# Patient Record
Sex: FEMALE | Race: WHITE | NOT HISPANIC OR LATINO | Employment: FULL TIME | URBAN - METROPOLITAN AREA
[De-identification: names, ages, dates, MRNs, and addresses within clinical notes are randomized per-mention and may not be internally consistent; named-entity substitution may affect disease eponyms.]

---

## 2017-01-31 ENCOUNTER — PATIENT MESSAGE (OUTPATIENT)
Dept: RHEUMATOLOGY | Facility: CLINIC | Age: 37
End: 2017-01-31

## 2017-01-31 ENCOUNTER — OFFICE VISIT (OUTPATIENT)
Dept: RHEUMATOLOGY | Facility: CLINIC | Age: 37
End: 2017-01-31
Payer: COMMERCIAL

## 2017-01-31 VITALS
DIASTOLIC BLOOD PRESSURE: 80 MMHG | HEIGHT: 66 IN | BODY MASS INDEX: 30.41 KG/M2 | HEART RATE: 96 BPM | SYSTOLIC BLOOD PRESSURE: 123 MMHG | WEIGHT: 189.19 LBS

## 2017-01-31 DIAGNOSIS — R76.8 POSITIVE ANA (ANTINUCLEAR ANTIBODY): Primary | ICD-10-CM

## 2017-01-31 DIAGNOSIS — E55.9 VITAMIN D INSUFFICIENCY: Primary | ICD-10-CM

## 2017-01-31 DIAGNOSIS — R53.83 FATIGUE, UNSPECIFIED TYPE: ICD-10-CM

## 2017-01-31 DIAGNOSIS — F32.A ANXIETY AND DEPRESSION: ICD-10-CM

## 2017-01-31 DIAGNOSIS — L50.9 URTICARIAL DERMATITIS: ICD-10-CM

## 2017-01-31 DIAGNOSIS — F41.9 ANXIETY AND DEPRESSION: ICD-10-CM

## 2017-01-31 DIAGNOSIS — E66.9 OBESITY (BMI 30.0-34.9): ICD-10-CM

## 2017-01-31 PROCEDURE — 99999 PR PBB SHADOW E&M-EST. PATIENT-LVL III: CPT | Mod: PBBFAC,,, | Performed by: INTERNAL MEDICINE

## 2017-01-31 PROCEDURE — 99245 OFF/OP CONSLTJ NEW/EST HI 55: CPT | Mod: S$GLB,,, | Performed by: INTERNAL MEDICINE

## 2017-01-31 RX ORDER — ASPIRIN 325 MG
50000 TABLET, DELAYED RELEASE (ENTERIC COATED) ORAL
Qty: 60 CAPSULE | Refills: 0 | Status: SHIPPED | OUTPATIENT
Start: 2017-02-02

## 2017-01-31 ASSESSMENT — ROUTINE ASSESSMENT OF PATIENT INDEX DATA (RAPID3)
AM STIFFNESS SCORE: 1, YES
WHEN YOU AWAKENED IN THE MORNING OVER THE LAST WEEK, PLEASE INDICATE THE AMOUNT OF TIME IT TAKES UNTIL YOU ARE AS LIMBER AS YOU WILL BE FOR THE DAY: 2 HOURS
FATIGUE SCORE: 3
MDHAQ FUNCTION SCORE: .2
TOTAL RAPID3 SCORE: 3.06
PSYCHOLOGICAL DISTRESS SCORE: 3.3
PATIENT GLOBAL ASSESSMENT SCORE: 4
PAIN SCORE: 4.5

## 2017-01-31 NOTE — MR AVS SNAPSHOT
[facility] - Rheumatology  [address]  [facility] [address]  Phone: [phone]  Fax: [phone]                  Jasmin Torres   2017 1:00 PM   Office Visit    Description:  Female : 1980   Provider:  Nasrin Ortiz MD   Department:  [facility] - Rheumatology           Reason for Visit     Disease Management           Diagnoses this Visit        Comments    Positive LYDIA (antinuclear antibody)    -  Primary     Urticarial dermatitis         Fatigue, unspecified type         Anxiety and depression         Obesity (BMI 30.0-34.9)                To Do List           Goals (5 Years of Data)     None      [facility]     [facility] On Call Nurse Care Line -  Assistance  Registered nurses in the [facility] On Call Center provide clinical advisement, health education, appointment booking, and other advisory services.  Call for this free service at [phone].             Medications           Message regarding Medications     Verify the changes and/or additions to your medication regime listed below are the same as discussed with your clinician today.  If any of these changes or additions are incorrect, please notify your healthcare provider.        STOP taking these medications     desloratadine (CLARINEX) 5 mg tablet Take 1 tablet (5 mg total) by mouth once daily.    ranitidine (ZANTAC) 150 MG capsule Take 1 capsule (150 mg total) by mouth 2 (two) times daily.    triamcinolone acetonide 0.1% (KENALOG) 0.1 % cream AAA bid           Verify that the below list of medications is an accurate representation of the medications you are currently taking.  If none reported, the list may be blank. If incorrect, please contact your healthcare provider. Carry this list with you in case of emergency.           Current Medications     hydrOXYzine HCl (ATARAX) 25 MG tablet Take 1 tablet (25 mg total) by mouth 3 (three) times daily.    desonide (DESOWEN) 0.05 % cream Apply topically 2  "(two) times daily.           Clinical Reference Information           Vital Signs - Last Recorded  Most recent update: 1/31/2017  1:12 PM by Rachel Chapa RN    BP Pulse Ht Wt BMI    123/80 96 5' 6" (1.676 m) 85.8 kg (189 lb 3.2 oz) 30.54 kg/m2      Blood Pressure          Most Recent Value    BP  123/80      Allergies as of 1/31/2017     Latex, Natural Rubber      Immunizations Administered on Date of Encounter - 1/31/2017     None      Orders Placed During Today's Visit     Future Labs/Procedures Expected by Expires    Protein / creatinine ratio, urine  1/31/2017 4/1/2018    Urinalysis  1/31/2017 4/1/2018    Vitamin D  1/31/2017 4/1/2018    Cyclic citrul peptide antibody, IgG  2/12/2017 1/31/2018    Rheumatoid factor  2/12/2017 1/31/2018    Recurring Lab Work Interval Expires    C-reactive protein   1/31/2018    C3 complement  Every 16 weeks until 4/1/2018 4/1/2018    C4 complement  Every 16 weeks until 4/1/2018 4/1/2018    CBC auto differential   1/31/2018    Comprehensive metabolic panel   1/31/2018    Sedimentation rate, manual   1/31/2018      Instructions      Daily, aerobic, low impact, dedicated, exercise.    Sign up to take [employer]    Fibromyalgia  Fibromyalgia is a chronic condition. It causes pain and tenderness in connective tissues. This causes muscle pain. Often, there are also many tender areas throughout the body. Symptoms may also include stiffness and feelings of numbness and tingling. Symptoms may be worse upon waking up. They may increase with poor sleep, heavy activity, cold or damp weather, anxiety, or stress.  People with fibromyalgia often feel tired. They may have trouble sleeping. Other symptoms include morning stiffness, headaches, and painful menstrual periods. Some people have problems with thinking clearly and changes in memory.  The cause of fibromyalgia is not known. Symptoms are similar to that of other diseases. These include rheumatoid arthritis, low thyroid, chronic fatigue " syndrome, and Lyme disease. In some cases, these diseases may occur together.  Fibromyalgia is often treated with medicines. You and your healthcare provider can discuss the medication plan that may work best for you. You may have to try more than one medicine or combination of medicines before you find what works for you.  Home care  · If your healthcare provider has prescribed or recommended medicines, take them as directed.  · Rest as needed. Try to get enough sleep. If you have trouble sleeping, discuss this with your healthcare provider.   · Be active. Regular exercise can help manage symptoms. Some options include walking, swimming, and biking. Strengthening exercises may also be helpful. Start an exercise program gradually. Talk to your healthcare provider about the best ways to be active.  · Follow a healthy diet. Limit caffeine and alcohol. If you smoke, ask your healthcare provider for help to stop.  · Notice how your body reacts to stress. Learn to listen to your body signals. This will help you take action before the stress becomes severe.  · Learn relaxation techniques. Also consider joining a stress reduction program or class.  · Talk to your healthcare provider about trying complementary treatments. These include acupuncture, hypnosis, and biofeedback. Yoga and juan chi may be helpful.  · Ask your healthcare provider about cognitive behavioral therapy (CBT). This type of counseling can help people with fibromyalgia cope better with their illness.  Follow-up care  Follow up with your healthcare provider or as advised by our staff. In many cases, fibromyalgia is best treated with a team approach.  This may involve your primary care provider, a rheumatologist, a physical therapist, and a mental health professional.   For more information: National [place] of Arthritis and Musculoskeletal and Skin Diseases (NIAMS)  www.niams.nih.gov 252-421-2721  When to seek medical advice  Contact your healthcare  provider if any of the following occurs:  · Symptoms getting worse or new symptoms developing  · You feel hopeless, helpless, or lose interest in day-to-day life  © 2784-9001 [employer]. [address] [place] [address], [place] [address]. All rights reserved. This information is not intended as a substitute for professional medical care. Always follow your healthcare professional's instructions.        Managing Fibromyalgia  Fibromyalgia is a chronic illness that causes pain in specific points on the body, stiffness, and fatigue. But it doesnt have to keep you from doing what you enjoy. You can feel better. You and your healthcare provider can work together to develop a plan.  Take medications as directed  You may be given medications to help reduce pain and improve sleep.  Several medications are approved to treat fibromyalgia. Two were originally designed to treat depression. They are duloxetine, and milnacipran. A third, called pregaballin, was developed to treat nerve pain. Other medications include pain relievers such as acetaminophen or stronger narcotics. These may be prescribed short term.  NSAIDs (non-steroidal anti-inflammatory drugs) include aspirin, ibuprofen and naproxen are used to relieve pain.  Get exercise    Gentle exercise can help lessen your pain. Try these tips:  · Choose activities that are gentle on your joints, such as walking, biking, and swimming or other water exercises.  · Dont push yourself too hard. Build up your strength and endurance slowly, over time.  · Stick to it. For the most relief, exercise should become part of your daily life.  Get a good nights rest  To help you get more sleep, try the tips below:  · Sleep only in a bed, not on a couch or chair.  · Dont watch TV, read, or work in bed.  · Go to bed and get up at the same time each day.  · Try to avoid naps.  · Avoid alcohol, caffeine, and tobacco for at least 3 hours before going to bed.  · Avoid fluids in the  evening to avoid having to get up to urinate.  Other things you can do  No one knows what causes fibromyalgia. But stress, poor eating habits, and extra weight can make it worse. These tips may help you feel better:  · Eat a balanced diet with plenty of fruits and vegetables, whole grains, lean protein, and low-fat or nonfat dairy products.  · Maintain a healthy weight.  · Learn ways to reduce or manage the stress in your life.  · Ask your healthcare provider for resources to help you make changes. Or check out the resources below.  Resources  · Arthritis Foundation  www.arthritis.org  · National Fibromyalgia Association  www.fmaware.org  · American Fibromyalgia Syndrome Association  www.afsafund.org  © 7906-0354 [employer]. [address] [place] [address], [place] [address]. All rights reserved. This information is not intended as a substitute for professional medical care. Always follow your healthcare professional's instructions.        Understanding Fibromyalgia  Fibromyalgia is a condition that causes pain at specific points on the body, stiffness, and fatigue.     People with fibromyalgia tend to have at least 11 of the 18 tender points shown above.   What are the symptoms of fibromyalgia?  In most cases, you will have tender points on your body. These points are very sore, especially when touched. Finding several of these points helps your healthcare provider diagnose fibromyalgia.  Along with the tender points, you may have some or all of the following symptoms:  · Constant tiredness (fatigue), even after a full nights sleep (non-restorative sleep)  · A burning or throbbing pain in many parts of the body (this pain may vary during the day)  · Stiffness or aching all over your body  · Numbness or tingling in your arms and legs  · Trouble sleeping  · Bowel problems (bloating, diarrhea, constipation)     A burning or throbbing pain is often associated with fibromyalgia.   · Headaches  · Depression  How  is fibromyalgia diagnosed?  There is no lab test to diagnose fibromyalgia. Instead, your healthcare provider will take your health history and examine your joints and muscles. Certain criteria are used when diagnosing fibromyalgia. Symptoms need to be present for at least 3 months. Tests may be done to rule out other conditions with similar symptoms. Then, together you can design a plan to help you manage your symptoms.   How is fibromyalgia treated?  Several medications are approved to treat fibromyalgia. Two were originally made to treat depression. They are duloxetine, and milnacipran. A third, called pregaballin, was developed to treat nerve pain. Certain medicines used to treat depression have been helpful with fibromyalgia. Other medications include pain relievers such as acetaminophen or stronger narcotics. These may be prescribed short term.  NSAIDs (nonsteroidal anti-inflammatory drugs) including aspirin, ibuprofen, and naproxen are used to relieve pain.  Getting enough sleep, regular exercise, and eating a healthy diet can help manage symptoms. Cognitive behavioral therapy (a form of psychotherapy) can be helpful for fibromyalgia. Some people find alternative treatments such as massage, chiropractic treatments, biofeedback, or acupuncture also help with symptoms.  © 5123-4130 [employer], [employer]. [address] [place] [address], [place] [address]. All rights reserved. This information is not intended as a substitute for professional medical care. Always follow your healthcare professional's instructions.

## 2017-01-31 NOTE — PROGRESS NOTES
"Subjective:       Patient ID: Jasmin Torres is a 36 y.o. female sent by Dr. Vargas for a + LYDIA associated with urticarial dermatitis.    Chief Complaint: No chief complaint on file.    HPI   36 yr old lady who states her present sxs are achiness & constant fatigue "alwary tired"(but rates it as 3/10 on RETA), brain fog & SOB.  She's been having intermittent urticarial dermatitis (dx by bx) on arms since Sept. 2016 for which she is taking hydroxyzine, but only helps itching & results in drowsiness so she only takes it at night.   Joint pain mostly hands MCPs & PIPs & wrists & knees with swelling of fingers x 15 years. Nothing helps. Saw a rheumatologist in 2001 & he dx Gene Danlos & put her on HCQ & other meds (can't recall others). Also on celebrex but didn't help & she lost weight.  Also was dx with fibromyalgia.  AM stiffness x 2 hrs. Denies Raynaud's, dysphagia, tight skin, alopecia, oral ulcers, pleurisy, pericarditis, photosensitivity, skin rashes, miscarriages (0/0), thromboses. Has dry eyes but not mouth. Was on HCQ x 4 months without effect. Does not recall prednisone use. NSAIDs do not help;     Reports fatigue, poor sleep, stiffness, poor memory, headaches, restless legs, depression , excessive anxiety, low back pain, numbness & tingling of fingers & toes, muscle spasms. Jaw pain, sensitivity to bright lights, loud noises, perfumes & weather changes & occasionally poor balance.    Patient admits she's under much stress especially lately.   Current Outpatient Prescriptions   Medication Sig Dispense Refill    hydrOXYzine HCl (ATARAX) 25 MG tablet Take 1 tablet (25 mg total) by mouth 3 (three) times daily. 60 tablet 0    desonide (DESOWEN) 0.05 % cream Apply topically 2 (two) times daily. 15 g 1     No current facility-administered medications for this visit.        Review of patient's allergies indicates:   Allergen Reactions    Latex, natural rubber Rash       Past Medical History   Diagnosis Date "    Anxiety     Asthma     Gene-Danlos syndrome     Mitral prolapse     RSD (reflex sympathetic dystrophy)      in foot    RSD lower limb      left leg    Sleep apnea      Hospitalized for concussions with no loss of consciousness.     No past surgical history on file.      Review of Systems   Constitutional: Positive for diaphoresis, fatigue (unrested in AM but worse time in evening.) and fever (99 or less).   HENT: Positive for tinnitus and voice change. Negative for mouth sores, sore throat and trouble swallowing.         Frequent sneezing   Eyes: Negative.  Negative for visual disturbance.        Dry eyes   Respiratory: Positive for cough and shortness of breath. Negative for choking and chest tightness.    Cardiovascular: Positive for chest pain and palpitations. Negative for leg swelling.   Gastrointestinal: Negative for abdominal distention, abdominal pain, blood in stool, constipation, diarrhea, nausea and vomiting.   Endocrine: Positive for heat intolerance.   Genitourinary: Negative.  Negative for frequency, hematuria and menstrual problem.   Musculoskeletal: Positive for arthralgias, back pain, myalgias, neck pain and neck stiffness. Negative for joint swelling.   Skin: Positive for rash.   Neurological: Positive for dizziness, weakness, numbness and headaches. Negative for syncope and light-headedness.   Hematological: Positive for adenopathy (cervical). Bruises/bleeds easily.   Psychiatric/Behavioral: Positive for dysphoric mood and sleep disturbance (both falling & staying). Behavioral problem: easily loses temper.frequent. The patient is nervous/anxious.        Family History   Problem Relation Age of Onset    Allergies Mother     Angioedema Neg Hx     Asthma Neg Hx     Atopy Neg Hx     Eczema Neg Hx     Immunodeficiency Neg Hx     Rhinitis Neg Hx     Urticaria Neg Hx      F: 57 pulmonary AVM which was resected  M 57: fibromyalgia;   4 S & 2 B: 1S 34 with breast cancer; 1S: also RSD &  "dx with lyme disease;     Social History   Substance Use Topics    Smoking status: Never Smoker    Smokeless tobacco: None    Alcohol use 0.0 oz/week     0 Standard drinks or equivalent per week      Comment: once per month    for [employer].  Rarely exercises.        Objective:       Visit Vitals    /80    Pulse 96    Ht 5' 6" (1.676 m)    Wt 85.8 kg (189 lb 3.2 oz)    BMI 30.54 kg/m2       Physical Exam   Vitals reviewed.  Constitutional: She is oriented to person, place, and time and well-developed, well-nourished, and in no distress. No distress.   HENT:   Head: Normocephalic and atraumatic.   Mouth/Throat: Oropharynx is clear and moist. No oropharyngeal exudate.   No facial rashes  Parotids not enlarged  No oral ulcers   Eyes: Conjunctivae and EOM are normal. Pupils are equal, round, and reactive to light. Right eye exhibits no discharge. Left eye exhibits no discharge. No scleral icterus.   Neck: Neck supple. No JVD present. No tracheal deviation present. No thyromegaly present.   Cardiovascular: Normal rate, regular rhythm, normal heart sounds and intact distal pulses.  Exam reveals no gallop and no friction rub.    No murmur heard.  Pulmonary/Chest: Effort normal and breath sounds normal. No respiratory distress. She has no wheezes. She has no rales. She exhibits no tenderness.   Abdominal: Soft. Bowel sounds are normal. She exhibits no distension and no mass. There is no splenomegaly or hepatomegaly. There is no tenderness. There is no rebound and no guarding.   Lymphadenopathy:     She has no cervical adenopathy.        Right: No inguinal adenopathy present.        Left: No inguinal adenopathy present.   Neurological: She is alert and oriented to person, place, and time. She has normal reflexes. No cranial nerve deficit. Gait normal.   Proximal and distal muscle strength 5/5.  Neg Tinel's & Phalen's   Skin: Skin is warm and dry. No rash noted. She is not diaphoretic. "     Psychiatric: Mood, memory, affect and judgment normal.   Musculoskeletal: Normal range of motion. She exhibits no edema or tenderness.   Cspine FROM no tenderness  Tspine FROM no tenderness  Lspine FROM no tenderness.  TMJ: mild bilateral crepitus  Shoulders: FROM; no synovitis;  Elbows: FROM; no synovitis; no tophi or nodules  Wrists: FROM; no synovitis;    MCPs: FROM; no synovitis; no metacarpalgia;  ok;  PIPs:FROM; no synovitis;   DIPs: FROM; no synovitis;   HIPS: FROM  Knees: FROM; no synovitis; no instability;bilateral PF crepitus;   Ankles: FROM: no synovitis   Toes: ok; no metatarsalgia.    Beighton score 2-4: + can touch thumbs to forearms; 5th MCPs almost hyperextended to 90 degrees.               Labs:   10/28/16: CBC plt 388; CMP K+ 3.2; LYDIA 1:320 sp; neg pro; neg dsDNA; total complement: 125 (ok);   10/28/16: Skin punch bx ALLAN arm:- URTICARIAL DERMATITIS: normal epidermis; dermis exhibits edema and a fairly  brisk perivascular and interstitial infiltrate composed of lymphocytes, neutrophils and eosinophils. There are dilated  dermal vessels containing aggregates of leukocytes. Multiple levels were examined.  COMMENT: These histologic features may be compatible with a robust form of urticaria. An alternative dermal  hypersensitivity reaction or an urticarial drug eruption cannot be excluded. Vasculitis is not identified in these  sections.     10/30/15: Brain MRI: normal  Assessment:   Positive LYDIA 1:320 sp   Neg profile   No evidence to support CTD  Urticarial dermatitis   No vasculitis on bx.  Anxiety/depression  Central sensitivity syndrome  Migraine headaches.  PMH of Complex Regional Pain syndrome  Mild hypermobility arthralgia  Hypokalemia  PF crepitus of knees  Obesity      Plan:   We will finish the CTD w/u.  Discussed lack of evidence to support a  CTD despite +LYDIA;   Hypermobility of hands was discussed.   The patient was educated on fibromyalgia.  Symptoms/presentations,  non-pharmacologic and pharmacologic treatments and their efficacies were reviewed.   Non-pharmacologic approaches were stressed.  ExPRESS was reviewed.  Regular/daily exercise was especially emphasized. Strategies for success were offered.  Cognitive behavioral therapy is very helpful.  Mood Gym was prescribed.   Pharmacologic treatments approved and otherwise were reviewed.  Cymbalta, Savella and Lyrica were reviewed, including side effects and toxicities.   Patient was provided with written information and referred to a website for further information.  Weight loss through diet and exercise.   Patient shown quadriceps strengthening exercises.   Patient to be followed by Dr. Henson.  RTC prn.      ADONIS. MD Alexi Odom MD

## 2017-01-31 NOTE — LETTER
January 31, 2017      Majo Vargas MD  [address]  [address]  [facility] [address]           [facility]  [address]  [facility] [address]  Phone: [phone]  Fax: [phone]          Patient: Jasmin Torres   MR Number: [id number]   YOB: 1980   Date of Visit: 1/31/2017       Dear Dr. Majo Vargas:    Thank you for referring Jasmin Torres to me for evaluation. Attached you will find relevant portions of my assessment and plan of care.    If you have questions, please do not hesitate to call me. I look forward to following Jasmin Torres along with you.    Sincerely,    Nasrin Ortiz MD    Enclosure  CC:  No Recipients    If you would like to receive this communication electronically, please contact [email or web address] or [phone] to request more information on [employer] access.    For providers and/or their staff who would like to refer a patient to [facility], please contact us through our one-stop-shop provider referral line, [facility], at [phone].    If you feel you have received this communication in error or would no longer like to receive these types of communications, please e-mail [email or web address]

## 2017-01-31 NOTE — PATIENT INSTRUCTIONS
Daily, aerobic, low impact, dedicated, exercise.    Sign up to take [employer]    Fibromyalgia  Fibromyalgia is a chronic condition. It causes pain and tenderness in connective tissues. This causes muscle pain. Often, there are also many tender areas throughout the body. Symptoms may also include stiffness and feelings of numbness and tingling. Symptoms may be worse upon waking up. They may increase with poor sleep, heavy activity, cold or damp weather, anxiety, or stress.  People with fibromyalgia often feel tired. They may have trouble sleeping. Other symptoms include morning stiffness, headaches, and painful menstrual periods. Some people have problems with thinking clearly and changes in memory.  The cause of fibromyalgia is not known. Symptoms are similar to that of other diseases. These include rheumatoid arthritis, low thyroid, chronic fatigue syndrome, and Lyme disease. In some cases, these diseases may occur together.  Fibromyalgia is often treated with medicines. You and your healthcare provider can discuss the medication plan that may work best for you. You may have to try more than one medicine or combination of medicines before you find what works for you.  Home care  · If your healthcare provider has prescribed or recommended medicines, take them as directed.  · Rest as needed. Try to get enough sleep. If you have trouble sleeping, discuss this with your healthcare provider.   · Be active. Regular exercise can help manage symptoms. Some options include walking, swimming, and biking. Strengthening exercises may also be helpful. Start an exercise program gradually. Talk to your healthcare provider about the best ways to be active.  · Follow a healthy diet. Limit caffeine and alcohol. If you smoke, ask your healthcare provider for help to stop.  · Notice how your body reacts to stress. Learn to listen to your body signals. This will help you take action before the stress becomes severe.  · Learn relaxation  techniques. Also consider joining a stress reduction program or class.  · Talk to your healthcare provider about trying complementary treatments. These include acupuncture, hypnosis, and biofeedback. Yoga and juan chi may be helpful.  · Ask your healthcare provider about cognitive behavioral therapy (CBT). This type of counseling can help people with fibromyalgia cope better with their illness.  Follow-up care  Follow up with your healthcare provider or as advised by our staff. In many cases, fibromyalgia is best treated with a team approach.  This may involve your primary care provider, a rheumatologist, a physical therapist, and a mental health professional.   For more information: [employer] [place] [employer]th[employer] and Musculoskeletal and Skin Diseases (NIAMS)  www.niams.nih.gov 070-072-3189  When to seek medical advice  Contact your healthcare provider if any of the following occurs:  · Symptoms getting worse or new symptoms developing  · You feel hopeless, helpless, or lose interest in day-to-day life  © 4252-5744 [employer]. [address] [place] [address]. All rights reserved. This information is not intended as a substitute for professional medical care. Always follow your healthcare professional's instructions.        Managing Fibromyalgia  Fibromyalgia is a chronic illness that causes pain in specific points on the body, stiffness, and fatigue. But it doesnt have to keep you from doing what you enjoy. You can feel better. You and your healthcare provider can work together to develop a plan.  Take medications as directed  You may be given medications to help reduce pain and improve sleep.  Several medications are approved to treat fibromyalgia. Two were originally designed to treat depression. They are duloxetine, and milnacipran. A third, called pregaballin, was developed to treat nerve pain. Other medications include pain relievers such as acetaminophen or stronger narcotics. These  may be prescribed short term.  NSAIDs (non-steroidal anti-inflammatory drugs) include aspirin, ibuprofen and naproxen are used to relieve pain.  Get exercise    Gentle exercise can help lessen your pain. Try these tips:  · Choose activities that are gentle on your joints, such as walking, biking, and swimming or other water exercises.  · Dont push yourself too hard. Build up your strength and endurance slowly, over time.  · Stick to it. For the most relief, exercise should become part of your daily life.  Get a good nights rest  To help you get more sleep, try the tips below:  · Sleep only in a bed, not on a couch or chair.  · Dont watch TV, read, or work in bed.  · Go to bed and get up at the same time each day.  · Try to avoid naps.  · Avoid alcohol, caffeine, and tobacco for at least 3 hours before going to bed.  · Avoid fluids in the evening to avoid having to get up to urinate.  Other things you can do  No one knows what causes fibromyalgia. But stress, poor eating habits, and extra weight can make it worse. These tips may help you feel better:  · Eat a balanced diet with plenty of fruits and vegetables, whole grains, lean protein, and low-fat or nonfat dairy products.  · Maintain a healthy weight.  · Learn ways to reduce or manage the stress in your life.  · Ask your healthcare provider for resources to help you make changes. Or check out the resources below.  Resources  · Arthritis Foundation  www.arthritis.org  · National Fibromyalgia Association  www.fmaware.org  · American Fibromyalgia Syndrome Association  www.afsafund.org  © 4217-1158 [employer]. [address] [place] [address], [place] [address]. All rights reserved. This information is not intended as a substitute for professional medical care. Always follow your healthcare professional's instructions.        Understanding Fibromyalgia  Fibromyalgia is a condition that causes pain at specific points on the body, stiffness, and fatigue.      People with fibromyalgia tend to have at least 11 of the 18 tender points shown above.   What are the symptoms of fibromyalgia?  In most cases, you will have tender points on your body. These points are very sore, especially when touched. Finding several of these points helps your healthcare provider diagnose fibromyalgia.  Along with the tender points, you may have some or all of the following symptoms:  · Constant tiredness (fatigue), even after a full nights sleep (non-restorative sleep)  · A burning or throbbing pain in many parts of the body (this pain may vary during the day)  · Stiffness or aching all over your body  · Numbness or tingling in your arms and legs  · Trouble sleeping  · Bowel problems (bloating, diarrhea, constipation)     A burning or throbbing pain is often associated with fibromyalgia.   · Headaches  · Depression  How is fibromyalgia diagnosed?  There is no lab test to diagnose fibromyalgia. Instead, your healthcare provider will take your health history and examine your joints and muscles. Certain criteria are used when diagnosing fibromyalgia. Symptoms need to be present for at least 3 months. Tests may be done to rule out other conditions with similar symptoms. Then, together you can design a plan to help you manage your symptoms.   How is fibromyalgia treated?  Several medications are approved to treat fibromyalgia. Two were originally made to treat depression. They are duloxetine, and milnacipran. A third, called pregaballin, was developed to treat nerve pain. Certain medicines used to treat depression have been helpful with fibromyalgia. Other medications include pain relievers such as acetaminophen or stronger narcotics. These may be prescribed short term.  NSAIDs (nonsteroidal anti-inflammatory drugs) including aspirin, ibuprofen, and naproxen are used to relieve pain.  Getting enough sleep, regular exercise, and eating a healthy diet can help manage symptoms. Cognitive behavioral  therapy (a form of psychotherapy) can be helpful for fibromyalgia. Some people find alternative treatments such as massage, chiropractic treatments, biofeedback, or acupuncture also help with symptoms.  © 5906-9566 [employer]. [address] [place] [address], [place] [address]. All rights reserved. This information is not intended as a substitute for professional medical care. Always follow your healthcare professional's instructions.